# Patient Record
Sex: FEMALE | Race: BLACK OR AFRICAN AMERICAN | NOT HISPANIC OR LATINO | URBAN - METROPOLITAN AREA
[De-identification: names, ages, dates, MRNs, and addresses within clinical notes are randomized per-mention and may not be internally consistent; named-entity substitution may affect disease eponyms.]

---

## 2018-05-27 ENCOUNTER — EMERGENCY (EMERGENCY)
Facility: HOSPITAL | Age: 28
LOS: 0 days | Discharge: HOME | End: 2018-05-27
Attending: EMERGENCY MEDICINE
Payer: COMMERCIAL

## 2018-05-27 VITALS
TEMPERATURE: 98 F | DIASTOLIC BLOOD PRESSURE: 69 MMHG | WEIGHT: 154.98 LBS | RESPIRATION RATE: 19 BRPM | HEIGHT: 68 IN | SYSTOLIC BLOOD PRESSURE: 98 MMHG | OXYGEN SATURATION: 96 % | HEART RATE: 75 BPM

## 2018-05-27 VITALS
HEART RATE: 81 BPM | DIASTOLIC BLOOD PRESSURE: 69 MMHG | TEMPERATURE: 99 F | SYSTOLIC BLOOD PRESSURE: 111 MMHG | OXYGEN SATURATION: 97 % | RESPIRATION RATE: 20 BRPM

## 2018-05-27 DIAGNOSIS — R07.9 CHEST PAIN, UNSPECIFIED: ICD-10-CM

## 2018-05-27 DIAGNOSIS — J30.1 ALLERGIC RHINITIS DUE TO POLLEN: ICD-10-CM

## 2018-05-27 DIAGNOSIS — Z88.0 ALLERGY STATUS TO PENICILLIN: ICD-10-CM

## 2018-05-27 DIAGNOSIS — R60.9 EDEMA, UNSPECIFIED: ICD-10-CM

## 2018-05-27 LAB
ALBUMIN SERPL ELPH-MCNC: 3.7 G/DL — SIGNIFICANT CHANGE UP (ref 3.3–5)
ALP SERPL-CCNC: 64 U/L — SIGNIFICANT CHANGE UP (ref 40–120)
ALT FLD-CCNC: 13 U/L — SIGNIFICANT CHANGE UP (ref 12–78)
ANION GAP SERPL CALC-SCNC: 8 MMOL/L — SIGNIFICANT CHANGE UP (ref 5–17)
APTT BLD: 30.2 SEC — SIGNIFICANT CHANGE UP (ref 27.5–37.4)
AST SERPL-CCNC: 11 U/L — LOW (ref 15–37)
BASOPHILS # BLD AUTO: 0.06 K/UL — SIGNIFICANT CHANGE UP (ref 0–0.2)
BASOPHILS NFR BLD AUTO: 1.2 % — SIGNIFICANT CHANGE UP (ref 0–2)
BILIRUB SERPL-MCNC: 1.4 MG/DL — HIGH (ref 0.2–1.2)
BUN SERPL-MCNC: 8 MG/DL — SIGNIFICANT CHANGE UP (ref 7–23)
CALCIUM SERPL-MCNC: 8.2 MG/DL — LOW (ref 8.5–10.1)
CHLORIDE SERPL-SCNC: 107 MMOL/L — SIGNIFICANT CHANGE UP (ref 96–108)
CO2 SERPL-SCNC: 26 MMOL/L — SIGNIFICANT CHANGE UP (ref 22–31)
CREAT SERPL-MCNC: 0.81 MG/DL — SIGNIFICANT CHANGE UP (ref 0.5–1.3)
D DIMER BLD IA.RAPID-MCNC: <150 NG/ML DDU — SIGNIFICANT CHANGE UP
EOSINOPHIL # BLD AUTO: 0.17 K/UL — SIGNIFICANT CHANGE UP (ref 0–0.5)
EOSINOPHIL NFR BLD AUTO: 3.3 % — SIGNIFICANT CHANGE UP (ref 0–6)
GLUCOSE SERPL-MCNC: 85 MG/DL — SIGNIFICANT CHANGE UP (ref 70–99)
HCG SERPL-ACNC: <1 MIU/ML — SIGNIFICANT CHANGE UP
HCT VFR BLD CALC: 36.7 % — SIGNIFICANT CHANGE UP (ref 34.5–45)
HGB BLD-MCNC: 11.9 G/DL — SIGNIFICANT CHANGE UP (ref 11.5–15.5)
IMM GRANULOCYTES NFR BLD AUTO: 0.2 % — SIGNIFICANT CHANGE UP (ref 0–1.5)
INR BLD: 1.22 RATIO — HIGH (ref 0.88–1.16)
LYMPHOCYTES # BLD AUTO: 1.88 K/UL — SIGNIFICANT CHANGE UP (ref 1–3.3)
LYMPHOCYTES # BLD AUTO: 36.2 % — SIGNIFICANT CHANGE UP (ref 13–44)
MCHC RBC-ENTMCNC: 29 PG — SIGNIFICANT CHANGE UP (ref 27–34)
MCHC RBC-ENTMCNC: 32.4 GM/DL — SIGNIFICANT CHANGE UP (ref 32–36)
MCV RBC AUTO: 89.3 FL — SIGNIFICANT CHANGE UP (ref 80–100)
MONOCYTES # BLD AUTO: 0.49 K/UL — SIGNIFICANT CHANGE UP (ref 0–0.9)
MONOCYTES NFR BLD AUTO: 9.4 % — SIGNIFICANT CHANGE UP (ref 2–14)
NEUTROPHILS # BLD AUTO: 2.58 K/UL — SIGNIFICANT CHANGE UP (ref 1.8–7.4)
NEUTROPHILS NFR BLD AUTO: 49.7 % — SIGNIFICANT CHANGE UP (ref 43–77)
NRBC # BLD: 0 /100 WBCS — SIGNIFICANT CHANGE UP (ref 0–0)
PLATELET # BLD AUTO: 321 K/UL — SIGNIFICANT CHANGE UP (ref 150–400)
POTASSIUM SERPL-MCNC: 3.7 MMOL/L — SIGNIFICANT CHANGE UP (ref 3.5–5.3)
POTASSIUM SERPL-SCNC: 3.7 MMOL/L — SIGNIFICANT CHANGE UP (ref 3.5–5.3)
PROT SERPL-MCNC: 7.3 GM/DL — SIGNIFICANT CHANGE UP (ref 6–8.3)
PROTHROM AB SERPL-ACNC: 13.4 SEC — HIGH (ref 9.8–12.7)
RBC # BLD: 4.11 M/UL — SIGNIFICANT CHANGE UP (ref 3.8–5.2)
RBC # FLD: 11.5 % — SIGNIFICANT CHANGE UP (ref 10.3–14.5)
SODIUM SERPL-SCNC: 141 MMOL/L — SIGNIFICANT CHANGE UP (ref 135–145)
WBC # BLD: 5.19 K/UL — SIGNIFICANT CHANGE UP (ref 3.8–10.5)
WBC # FLD AUTO: 5.19 K/UL — SIGNIFICANT CHANGE UP (ref 3.8–10.5)

## 2018-05-27 PROCEDURE — 93970 EXTREMITY STUDY: CPT | Mod: 26

## 2018-05-27 PROCEDURE — 71045 X-RAY EXAM CHEST 1 VIEW: CPT | Mod: 26

## 2018-05-27 PROCEDURE — 93010 ELECTROCARDIOGRAM REPORT: CPT

## 2018-05-27 PROCEDURE — 99284 EMERGENCY DEPT VISIT MOD MDM: CPT

## 2018-05-27 RX ORDER — FAMOTIDINE 10 MG/ML
20 INJECTION INTRAVENOUS ONCE
Qty: 0 | Refills: 0 | Status: COMPLETED | OUTPATIENT
Start: 2018-05-27 | End: 2018-05-27

## 2018-05-27 RX ADMIN — Medication 30 MILLILITER(S): at 15:12

## 2018-05-27 RX ADMIN — FAMOTIDINE 20 MILLIGRAM(S): 10 INJECTION INTRAVENOUS at 15:12

## 2018-05-27 NOTE — ED PROVIDER NOTE - OBJECTIVE STATEMENT
28 year old female without significant PMH presenting to ED due to chest pressure x 1 day, no fever/chills bilateral leg swelling and no SOB. Denies chest trauma. 28 year old female without significant PMH presenting to ED due to chest pressure x 1 day, no fever/chills bilateral leg swelling and no SOB. Denies chest trauma. States in past leg swelling has been there too and wanted to make sure no clots were there, no recent travel.

## 2018-05-27 NOTE — ED PROVIDER NOTE - MUSCULOSKELETAL, MLM
Spine appears normal, range of motion is not limited, no muscle or joint tenderness nonpitting edema of bilateral feet, does not extend beyond ankle.

## 2018-05-27 NOTE — ED ADULT NURSE NOTE - OBJECTIVE STATEMENT
Patient alert with mother at bedside stating that she has had swelling in her left lower foot for 2 years and is concerned that yesterday it was really swollen. Elevated her foot and the swelling went away. No complaints of pain, no prior medical history, no prior surgical history.

## 2018-05-27 NOTE — ED ADULT TRIAGE NOTE - CHIEF COMPLAINT QUOTE
patient c/o of L foot swelling started 2 days ago , patient denied pain , patient stated she has this swelling on her foot on and off during the summer time for 2 years ,  patient c/o of chest pressure on and off since last night with difficulty breathing , denied N/V , patient c./o of upper back pain also , denied N/V , denied headache

## 2018-06-30 ENCOUNTER — EMERGENCY (EMERGENCY)
Facility: HOSPITAL | Age: 28
LOS: 1 days | Discharge: HOME | End: 2018-06-30
Admitting: EMERGENCY MEDICINE
Payer: COMMERCIAL

## 2018-06-30 VITALS
OXYGEN SATURATION: 98 % | HEART RATE: 80 BPM | TEMPERATURE: 98 F | DIASTOLIC BLOOD PRESSURE: 64 MMHG | SYSTOLIC BLOOD PRESSURE: 111 MMHG | RESPIRATION RATE: 18 BRPM
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
## 2018-06-30 DIAGNOSIS — F10.120 ALCOHOL ABUSE WITH INTOXICATION, UNCOMPLICATED: ICD-10-CM
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
## 2018-06-30 DIAGNOSIS — R41.82 ALTERED MENTAL STATUS, UNSPECIFIED: ICD-10-CM

## 2018-06-30 DIAGNOSIS — M25.472 EFFUSION, LEFT ANKLE: ICD-10-CM
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
## 2018-06-30 PROCEDURE — 73610 X-RAY EXAM OF ANKLE: CPT | Mod: 26,LT

## 2018-06-30 PROCEDURE — 99284 EMERGENCY DEPT VISIT MOD MDM: CPT | Mod: 25

## 2018-06-30 PROCEDURE — 72125 CT NECK SPINE W/O DYE: CPT | Mod: 26

## 2018-06-30 PROCEDURE — 70450 CT HEAD/BRAIN W/O DYE: CPT | Mod: 26

## 2018-06-30 NOTE — ED PROVIDER NOTE - PHYSICAL EXAMINATION
General: lethargic, arousable to touch, smells of alcohol  Head: NCAT, no lacerations  Eyes: PERRL  Heart: RRR  Lungs: CTAB  Abd: soft, NTND  Neuro: moves all 4 extremities equally  MSK: left ankle: swelling to lateral aspect, pulses intact, sensation intact  Skin: no e/o lacerations, abrasions, or ecchymoses
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
## 2018-06-30 NOTE — ED ADULT NURSE REASSESSMENT NOTE - NS ED NURSE REASSESS COMMENT FT1
Pt sleeping comfortably in stretcher at this time with no s.s of acute distress at this time. Pt stable, safety maintained. Will continue to monitor.
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
## 2022-06-28 ENCOUNTER — EMERGENCY (EMERGENCY)
Facility: HOSPITAL | Age: 32
LOS: 1 days | Discharge: HOME | End: 2022-06-28
Admitting: EMERGENCY MEDICINE

## 2022-06-28 VITALS
HEIGHT: 68 IN | HEART RATE: 81 BPM | SYSTOLIC BLOOD PRESSURE: 127 MMHG | RESPIRATION RATE: 16 BRPM | TEMPERATURE: 98 F | OXYGEN SATURATION: 100 % | DIASTOLIC BLOOD PRESSURE: 77 MMHG

## 2022-06-28 PROCEDURE — 99284 EMERGENCY DEPT VISIT MOD MDM: CPT
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
## 2022-06-29 VITALS
SYSTOLIC BLOOD PRESSURE: 108 MMHG | HEART RATE: 71 BPM | RESPIRATION RATE: 17 BRPM | OXYGEN SATURATION: 98 % | TEMPERATURE: 99 F | DIASTOLIC BLOOD PRESSURE: 74 MMHG

## 2022-06-29 PROCEDURE — 73502 X-RAY EXAM HIP UNI 2-3 VIEWS: CPT | Mod: 26,LT

## 2022-06-29 RX ORDER — IBUPROFEN 200 MG
600 TABLET ORAL ONCE
Refills: 0 | Status: COMPLETED | OUTPATIENT
Start: 2022-06-29 | End: 2022-06-29

## 2022-06-29 RX ADMIN — Medication 600 MILLIGRAM(S): at 01:34

## 2022-06-29 NOTE — ED PROVIDER NOTE - PATIENT PORTAL LINK FT
You can access the FollowMyHealth Patient Portal offered by [facility] by registering at the following website: [email or web address]. By joining [email or web address]’s FollowMyHealth portal, you will also be able to view your health information using other applications (apps) compatible with our system.
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
## 2022-07-07 ENCOUNTER — EMERGENCY (EMERGENCY)
Facility: HOSPITAL | Age: 32
LOS: 1 days | Discharge: HOME | End: 2022-07-07
Attending: EMERGENCY MEDICINE
Payer: COMMERCIAL

## 2022-07-07 VITALS
WEIGHT: 160.06 LBS | RESPIRATION RATE: 18 BRPM | OXYGEN SATURATION: 96 % | TEMPERATURE: 99 F | HEART RATE: 58 BPM | HEIGHT: 68 IN | DIASTOLIC BLOOD PRESSURE: 65 MMHG | SYSTOLIC BLOOD PRESSURE: 107 MMHG

## 2022-07-07 VITALS
DIASTOLIC BLOOD PRESSURE: 72 MMHG | SYSTOLIC BLOOD PRESSURE: 109 MMHG | OXYGEN SATURATION: 100 % | RESPIRATION RATE: 18 BRPM | TEMPERATURE: 98 F | HEART RATE: 66 BPM

## 2022-07-07 LAB — HCG UR QL: NEGATIVE — SIGNIFICANT CHANGE UP

## 2022-07-07 PROCEDURE — 99284 EMERGENCY DEPT VISIT MOD MDM: CPT | Mod: 25

## 2022-07-07 PROCEDURE — 72125 CT NECK SPINE W/O DYE: CPT | Mod: 26,MA

## 2022-07-07 PROCEDURE — 72128 CT CHEST SPINE W/O DYE: CPT | Mod: MA

## 2022-07-07 PROCEDURE — 99285 EMERGENCY DEPT VISIT HI MDM: CPT

## 2022-07-07 PROCEDURE — 72131 CT LUMBAR SPINE W/O DYE: CPT | Mod: MA

## 2022-07-07 PROCEDURE — 72128 CT CHEST SPINE W/O DYE: CPT | Mod: 26,MA

## 2022-07-07 PROCEDURE — 72131 CT LUMBAR SPINE W/O DYE: CPT | Mod: 26,MA

## 2022-07-07 PROCEDURE — 81025 URINE PREGNANCY TEST: CPT

## 2022-07-07 PROCEDURE — 72125 CT NECK SPINE W/O DYE: CPT | Mod: MA

## 2022-07-07 RX ORDER — IBUPROFEN 200 MG
1 TABLET ORAL
Qty: 27 | Refills: 0
Start: 2022-07-07 | End: 2022-07-15

## 2022-07-07 RX ORDER — IBUPROFEN 200 MG
400 TABLET ORAL ONCE
Refills: 0 | Status: COMPLETED | OUTPATIENT
Start: 2022-07-07 | End: 2022-07-07

## 2022-07-07 RX ORDER — LIDOCAINE 4 G/100G
1 CREAM TOPICAL
Qty: 10 | Refills: 0
Start: 2022-07-07 | End: 2022-07-11

## 2022-07-07 RX ORDER — LIDOCAINE 4 G/100G
1 CREAM TOPICAL ONCE
Refills: 0 | Status: COMPLETED | OUTPATIENT
Start: 2022-07-07 | End: 2022-07-07

## 2022-07-07 RX ADMIN — Medication 400 MILLIGRAM(S): at 14:55

## 2022-07-07 RX ADMIN — LIDOCAINE 1 PATCH: 4 CREAM TOPICAL at 14:56

## 2022-07-07 NOTE — ED PROVIDER NOTE - PATIENT PORTAL LINK FT
You can access the FollowMyHealth Patient Portal offered by [facility] by registering at the following website: [email or web address]. By joining [employer]’s FollowMyHealth portal, you will also be able to view your health information using other applications (apps) compatible with our system.
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
## 2022-07-07 NOTE — ED ADULT NURSE REASSESSMENT NOTE - NS ED NURSE REASSESS COMMENT FT1
Report received from TOR Castaneda. Pt A/O x4. Well appearing. Pt to be given Motrin and lidocaine patch. Safety and comfort maintained.

## 2022-07-07 NOTE — ED PROVIDER NOTE - PROGRESS NOTE DETAILS
pt reassessed - reports pain is improved. ambulatory in ED without difficulty. CTs negative for acute pathology. no indication for emergent MRI as ED team has low clinical suspicion for acute cord compression/cauda offered urgent followup with spine center. Will dc with follow up. Discussed plan and return precautions with patient who understands and agrees. All questions answered. - Marcel Napier PA-C

## 2022-07-07 NOTE — ED PROVIDER NOTE - NS ED ATTENDING STATEMENT MOD
This was a shared visit with the GAB. I reviewed and verified the documentation and independently performed the documented:
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
## 2022-07-11 PROBLEM — Z00.00 ENCOUNTER FOR PREVENTIVE HEALTH EXAMINATION: Status: ACTIVE | Noted: 2022-07-11

## 2022-07-21 ENCOUNTER — APPOINTMENT (OUTPATIENT)
Dept: SPINE | Facility: CLINIC | Age: 32
End: 2022-07-21

## 2022-07-21 VITALS
WEIGHT: 168 LBS | BODY MASS INDEX: 25.46 KG/M2 | HEIGHT: 68 IN | DIASTOLIC BLOOD PRESSURE: 75 MMHG | SYSTOLIC BLOOD PRESSURE: 111 MMHG | OXYGEN SATURATION: 94 % | HEART RATE: 81 BPM

## 2022-07-21 DIAGNOSIS — M54.50 LOW BACK PAIN, UNSPECIFIED: ICD-10-CM

## 2022-07-21 PROCEDURE — 99203 OFFICE O/P NEW LOW 30 MIN: CPT

## 2022-07-21 PROCEDURE — 99072 ADDL SUPL MATRL&STAF TM PHE: CPT

## 2022-07-22 RX ORDER — IBUPROFEN 200 MG/1
200 TABLET ORAL
Refills: 0 | Status: ACTIVE | COMMUNITY

## 2022-07-22 NOTE — CONSULT LETTER
[Dear  ___] : Dear  [unfilled], [Courtesy Letter:] : I had the pleasure of seeing your patient, [unfilled], in my office today. [Please see my note below.] : Please see my note below. [Consult Closing:] : Thank you very much for allowing me to participate in the care of this patient.  If you have any questions, please do not hesitate to contact me. [Sincerely,] : Sincerely, [FreeTextEntry3] : Trent Grande MD\par Chief of Neurosurgery [facility]\par [facility]\par
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
## 2022-07-22 NOTE — PHYSICAL EXAM
[Person] : oriented to person [Place] : oriented to place [Time] : oriented to time [Short Term Intact] : short term memory intact [Remote Intact] : remote memory intact [Span Intact] : the attention span was normal [Concentration Intact] : normal concentrating ability [Fluency] : fluency intact [Comprehension] : comprehension intact [Current Events] : adequate knowledge of current events [Past History] : adequate knowledge of personal past history [Vocabulary] : adequate range of vocabulary [Cranial Nerves Optic (II)] : visual acuity intact bilaterally,  pupils equal round and reactive to light [Cranial Nerves Oculomotor (III)] : extraocular motion intact [Cranial Nerves Trigeminal (V)] : facial sensation intact symmetrically [Cranial Nerves Facial (VII)] : face symmetrical [Cranial Nerves Vestibulocochlear (VIII)] : hearing was intact bilaterally [Cranial Nerves Glossopharyngeal (IX)] : tongue and palate midline [Cranial Nerves Accessory (XI - Cranial And Spinal)] : head turning and shoulder shrug symmetric [Cranial Nerves Hypoglossal (XII)] : there was no tongue deviation with protrusion [Motor Tone] : muscle tone was normal in all four extremities [Motor Strength] : muscle strength was normal in all four extremities [No Muscle Atrophy] : normal bulk in all four extremities [Sensation Tactile Decrease] : light touch was intact [Abnormal Walk] : normal gait [Balance] : balance was intact [2+] : Ankle jerk left 2+ [No Tenderness to Palpation] : no spine tenderness on palpation [Able to toe walk] : the patient was able to toe walk [Able to heel walk] : the patient was able to heel walk [Past-pointing] : there was no past-pointing [Tremor] : no tremor present [Plantar Reflex Right Only] : normal on the right [Plantar Reflex Left Only] : normal on the left [Izaguirre] : Izaguirre's sign was not demonstrated [L'Hermitte's] : neck flexion did not produce tingling down the spine/arms [Spurling's - Opposite Side] : Negative Spurling's on opposite side [Spurling's Same Side] : Negative Spurling's on same side [Straight-Leg Raise Test - Left] : straight leg raise of the left leg was negative [Straight-Leg Raise Test - Right] : straight leg raise  of the right leg was negative

## 2022-07-25 ENCOUNTER — TRANSCRIPTION ENCOUNTER (OUTPATIENT)
Age: 32
End: 2022-07-25

## 2022-11-02 ENCOUNTER — APPOINTMENT (OUTPATIENT)
Dept: NEUROLOGY | Facility: CLINIC | Age: 32
End: 2022-11-02

## 2023-03-14 NOTE — ED PROVIDER NOTE - DISPOSITION TYPE
[facility]  Inpatient Services  Wound and Skin Care Brief Evaluation    Admission Date: 2/25/2023     Last order of IP CONSULT TO WOUND CARE was found on 3/6/2023 from Hospital Encounter on 2/25/2023     HPI, PMH, SH: Reviewed    No chief complaint on file.    Diagnosis: Acute respiratory failure with hypoxia (HCC) [J96.01]    Unit where seen by Wound Team: S407/00     Attending MD requested wound team return regarding right arm/axilla. Chart and images reviewed. This discussed with bedside RN. This RN in to assess patient. Pt mom at bedside but sleeping.  Patient right arm/axilla with partial thickness skin tear present, mepitel one in place to protect area, intact and can remain in place until area is completely healed. Area is improving as expected.  No pressure injuries or advanced wound care needs identified. Wound consult completed. No further follow up unless indicated and consulted.   Wound team is signing off.     RSKIN:   CURRENTLY IN PLACE (X), APPLIED THIS VISIT (A), ORDERED (O):   Q shift Prateek:  X  Q shift pressure point assessments:  X    Surface/Positioning n/a crib  Pressure redistribution mattress            Low Airloss          Bariatric foam      Bariatric AYUSH     ICU AYUSH                              Waffle cushion        Waffle Overlay          Reposition q 2 hours      TAPs Turning system     Z Abdelrahman Pillow      Respiratory n/a  Silicone O2 tubing         Gray Foam Ear protectors     Cannula fixation Device (Tender )          High flow offloading Clip    Elastic head band offloading device      Anchorfast                                                         Trach with Optifoam split foam             Containment/Moisture Prevention n/a    Rectal tube or BMS    Purwick/Condom Cath        Dupree Catheter    Barrier wipes           Barrier paste       Antifungal tx      Interdry        Mobilization RENA      Up to chair        Ambulate      PT/OT      Nutrition       Dietician         Diabetes Education      PO  breast milk   TF    TPN     NPO   # days     Other          DISCHARGE

## 2025-05-30 ENCOUNTER — NON-APPOINTMENT (OUTPATIENT)
Age: 35
End: 2025-05-30

## 2025-05-30 ENCOUNTER — EMERGENCY (EMERGENCY)
Facility: HOSPITAL | Age: 35
LOS: 1 days | End: 2025-05-30
Attending: EMERGENCY MEDICINE
Payer: COMMERCIAL

## 2025-05-30 VITALS
HEART RATE: 68 BPM | SYSTOLIC BLOOD PRESSURE: 107 MMHG | TEMPERATURE: 99 F | DIASTOLIC BLOOD PRESSURE: 70 MMHG | RESPIRATION RATE: 18 BRPM | OXYGEN SATURATION: 99 %

## 2025-05-30 VITALS
TEMPERATURE: 98 F | HEART RATE: 79 BPM | HEIGHT: 68 IN | SYSTOLIC BLOOD PRESSURE: 107 MMHG | WEIGHT: 164.91 LBS | RESPIRATION RATE: 16 BRPM | OXYGEN SATURATION: 98 % | DIASTOLIC BLOOD PRESSURE: 70 MMHG

## 2025-05-30 DIAGNOSIS — R09.A2 FOREIGN BODY SENSATION, THROAT: ICD-10-CM

## 2025-05-30 PROBLEM — Z78.9 OTHER SPECIFIED HEALTH STATUS: Chronic | Status: ACTIVE | Noted: 2022-07-07

## 2025-05-30 PROCEDURE — 81025 URINE PREGNANCY TEST: CPT

## 2025-05-30 PROCEDURE — 99285 EMERGENCY DEPT VISIT HI MDM: CPT

## 2025-05-30 PROCEDURE — 70490 CT SOFT TISSUE NECK W/O DYE: CPT | Mod: 26

## 2025-05-30 PROCEDURE — 31575 DIAGNOSTIC LARYNGOSCOPY: CPT

## 2025-05-30 PROCEDURE — 70490 CT SOFT TISSUE NECK W/O DYE: CPT

## 2025-05-30 PROCEDURE — 99283 EMERGENCY DEPT VISIT LOW MDM: CPT | Mod: 25

## 2025-05-30 PROCEDURE — 99284 EMERGENCY DEPT VISIT MOD MDM: CPT | Mod: 25

## 2025-05-30 NOTE — CONSULT NOTE ADULT - NS ATTEND AMEND GEN_ALL_CORE FT
ENT consulted for globus sensation after eating fish    36 y/o Female, no PMHx presents to ED c/o globus sensation x2 days. Pt states she ate fish (snapper) 2 days ago and felt something sharp get stuck in her throat. Pt then tried to eat/drink other foods in an attempt to push it down, however, no symptomatic improvement. Pt states she has been able to eat and drink but with some pain in her throat.     Flexible laryngoscopy shows no foreign body visualized. Vocal fold mobile and symmetrical. Airway patent.    Recommend:  Globus Sensation  - Recommend CT Neck   - No acute surgical ENT intervention required at this time, please call back if needed. ENT consulted for globus sensation after eating fish    34 y/o Female, no PMHx presents to ED c/o globus sensation x2 days. Pt states she ate fish (snapper) 2 days ago and felt something sharp get stuck in her throat. Pt then tried to eat/drink other foods in an attempt to push it down, however, no symptomatic improvement. Pt states she has been able to eat and drink but with some pain in her throat.     Flexible laryngoscopy shows no gross foreign body visualized. Vocal fold mobile and symmetrical. Airway patent.    Recommend:  Globus Sensation  - Recommend CT Neck to evaluate further any foreign body in airway  - Call ENT if any findings

## 2025-05-30 NOTE — ED PROVIDER NOTE - CARE PROVIDER_API CALL
Leona Painter  Otolaryngology  [address], [address]  [place] [address]  Phone: [phone]  Fax: [phone]  Follow Up Time: 4-6 Days    Regan Garcia  Otolaryngology  [address], [address]  [place] [address]  Phone: [phone]  Fax: [phone]  Follow Up Time: 4-6 Days    Radha Henderson  Otolaryngology  [address], [address]  [place] [address]  Phone: [phone]  Fax: [phone]  Follow Up Time: 4-6 Days    Ollie Crow  Otolaryngology  [address] [place] [address]  Phone: [phone]  Fax: [phone]  Follow Up Time: 4-6 Days

## 2025-05-30 NOTE — ED ADULT NURSE NOTE - CCCP TRG CHIEF CMPLNT
67M with seizures on keppra, BPH, hypothyroidism, sarcoidosis GERD, COPD, LAMBERT, recent admission to [facility] from 5/3-5/10/2021 for right subarachnoid hemorrhage s/p fall who presents from [facility] for hypoxia.  Found to have acute hypoxic and hypercapnic respiratory failure 2/2 sepsis 2/2 aspiration PNA.  Likely aspiration PNA since patient has been recommended dysphagia I diet from [facility] but reports state that he has been non-compliant with recommendation.  Also location of RLL and RUL also fits with aspiration.      Problems  Acute hypoxic and hypercapnic respiratory failure  Sepsis 2/2 aspiration PNA  Sarcoidosis  Seizures  COPD    Acute hypoxic and hypercapnic respiratory failure secondary to aspiration PNA and underlying interstitial lung disease  - admitted to [facility]  -off high flow:: now on nasal cannula   -CXR followup 6/25:: severe interestial disease +fibrosis    Sepsis from aspiration PNA with metabolic encephalopathy - sepsis resolving  - meropenem total 10 days as per ID, abx course now complete  - f/u blood cultures - NGTD  - speech and swallow eval -failed 6/29.  NGT placed for feeding and medication.  have discussed possibility of PEG with patient and sister; as per d/w speech therapist, pt still not in position to start po diet, will reattempt swallowing study as pt becomes more and more alert - for now recommend oral care and swallowing exercises  - OMFS eval appreciated - no signs of oral infection    Troponin Elevation  - likely demand ischemia, no need to trend further  - cardiology f/u appreciated  -  TTE noted - elevated RV pressures likely due to pna    Sarcoidosis/COPD  - started on steroids for history of bronchiectasis as well - being tapered by pulm  - symbicort - patient not able to use properly - can dc.     Seizure history  - cont with depakote    Hypothyroidism  - cont with synthroid - switch to NGT    General anxiety disorder  - restart Effexor    BPH  - Herrera dc'ed 6/30,  flomax cannot be given via NGT.  Proscar started 6/29    Preventive measures  - can start with lovenox 40mg subq for DVT ppx    Patient critically ill, high risk for deterioration.     foreign body throat

## 2025-05-30 NOTE — ED PROVIDER NOTE - PROGRESS NOTE DETAILS
Dr. Vanessa: D/w ENT attending Dr. Crow as well ENT at bedside. As discuss with ENT no visualized FB via laryngoscope and recommended neck CT. Pt came back from CT. NAD. Pending result now. Attending MD Bradley: Spoke with Dr. Randle of radiology, + foreign body noted on CT, ENT called and made aware Attending MD Bradley: ENT MARIA DEL CARMEN Griffith re-evaluated patient, area in question palpated and re-visualized with nothing.  Patient reports resolution of discomfort.  Patient to follow up with outpatient ENT in a week.  Stable for discharge. Follow up instructions given, importance of follow up emphasized, return to ED parameters reviewed and patient verbalized understanding.  All questions answered, all concerns addressed. Print out of available imaging will be given to patient as part of their discharge paperwork.

## 2025-05-30 NOTE — ED PROVIDER NOTE - SHIFT CHANGE DETAILS
f/u ENT, xray, reassess f/u ENT, xray, reassess    16:12 Mirna from [facility]  Attending MD Bradley: 35F w sensation of having fishbone stuck in throat, tolerating po, seen by ENT and no foreign body visualized, pending CT neck

## 2025-05-30 NOTE — ED PROVIDER NOTE - NSFOLLOWUPINSTRUCTIONS_ED_ALL_ED_FT
YOU WERE SEEN IN THE ED FOR: foreign body of throat     WHILE YOU WERE HERE, YOU HAD: CT of your neck.  It showed: tiny 5mm radiodense foreign body consistent with a fish in the left oropharyngeal mucosal space medial and inferior to the palatine tonsil. You were evaluated by ENT but nothing was removed.  You reported resolution of your symptoms.    FOR PAIN, YOU MAY TAKE TYLENOL (ACETAMINOPHEN) AND/OR IBUPROFEN (Advil or Motrin). FOLLOW THE INSTRUCTIONS ON THE LABEL/CONTAINER.  DO NOT EXCEED 4000MG OF TYLENOL (ACETAMINOPHEN) IN A 24 HOUR PERIOD. TAKE IBUPROFEN WITH FOOD.      PLEASE FOLLOW UP WITH ENT IN A WEEK. BRING COPIES OF YOUR RESULTS/DISCHARGE PAPERS.    RETURN TO THE EMERGENCY DEPARTMENT IF YOU EXPERIENCE ANY NEW/CONCERNING/WORSENING SYMPTOMS SUCH AS BUT NOT LIMITED TO: RETURN OF SYMPTOMS, DIFFICULTY SWALLOWING, DIFFICULTY BREATHING, CHEST PAIN, SEVERE ABDOMINAL PAIN OR ANY OTHER CONCERNS.

## 2025-05-30 NOTE — ED PROVIDER NOTE - PATIENT PORTAL LINK FT
You can access the FollowMyHealth Patient Portal offered by [facility] by registering at the following website: [email or web address]. By joining [employer]’s FollowMyHealth portal, you will also be able to view your health information using other applications (apps) compatible with our system.

## 2025-05-30 NOTE — ED PROVIDER NOTE - PROVIDER TOKENS
PROVIDER:[TOKEN:[[id number]],FOLLOWUP:[4-6 Days]],PROVIDER:[TOKEN:[[id number]],FOLLOWUP:[4-6 Days]],PROVIDER:[TOKEN:[[id number]],FOLLOWUP:[4-6 Days]],PROVIDER:[TOKEN:[[id number]],FOLLOWUP:[4-6 Days]]

## 2025-05-30 NOTE — ED ADULT NURSE NOTE - OBJECTIVE STATEMENT
35y Female AOX4 presents to the ED c/o foreign body. Pt reports fishbone in throat about 2 days ago. Has been able to tolerate food and drink but experiencing increased pain in throat. No acute respiratory distress or drooling noted. Denies N/V, fever/chills, SOB, chest pain. Spontaneous/unlabored respirations, speaking in full sentences. Side rails up, bed in lowest position, safety maintained.

## 2025-05-30 NOTE — ED ADULT TRIAGE NOTE - CHIEF COMPLAINT QUOTE
Patient c/o possible fish bone stuck in her throat since last night. Pain with swallowing. No diff breathing.

## 2025-05-30 NOTE — ED PROVIDER NOTE - CARE PROVIDERS DIRECT ADDRESSES
[email or web address]

## 2025-05-30 NOTE — ED PROVIDER NOTE - CLINICAL SUMMARY MEDICAL DECISION MAKING FREE TEXT BOX
Dr. Vanessa: 35-year-old female no past medical history was eating fish 2 days ago and feels a fishbone stuck on the left side of her throat.  Able to tolerate secretions, and eat and drink.  No nausea vomiting abdominal pain.  No other complaints.    Gen: No acute distress  HEENT: Mucous membranes moist, pink conjunctivae, EOMI, no crepitus, no visualized foreign body  CV: RRR, no clubbing/cyanosis/edema  Resp: CTAB  GI: Abdomen soft, NT, ND. Normal BS. No rebound, no guarding  : No CVAT  Neuro: A&O x 3, moving all 4 extremities  MSK: No spine or joint tenderness to palpation  Skin: No rashes    Patient presenting with foreign body sensation after eating fish, feels a fishbone stuck in her throat.  Will get x-ray however fishbone is likely radiolucent.  Discussed with ENT PA who will scope patient.  ENT attending Dr. Crow was in the ED, discussed with him, he will follow-up patient's results as well.

## 2025-05-30 NOTE — ED PROVIDER NOTE - OBJECTIVE STATEMENT
36yo female, no PMHx presents to ED with Dineshe stuck in her throat 2days ago. Pt states she's been able to eat and drink but felt pain in her throat. Denies fever, chills, N/V, or abd pain.

## 2025-05-30 NOTE — ED PROVIDER NOTE - ATTENDING APP SHARED VISIT CONTRIBUTION OF CARE
Dr. Vanessa: I performed a face to face bedside interview with patient regarding history of present illness, review of symptoms and past medical history. I completed an independent physical exam.  I have discussed patient's plan of care with GAB.   I agree with note as stated above, having amended the EMR as needed to reflect my findings.   This includes HISTORY OF PRESENT ILLNESS, HIV, PAST MEDICAL/SURGICAL/FAMILY/SOCIAL HISTORY, ALLERGIES AND HOME MEDICATIONS, REVIEW OF SYSTEMS, PHYSICAL EXAM, and any PROGRESS NOTES during the time I functioned as the attending physician for this patient.    see mdm

## 2025-05-30 NOTE — CONSULT NOTE ADULT - PROBLEM SELECTOR RECOMMENDATION 9
- Recommend CT Neck   - No acute surgical ENT intervention required at this time, please call back if needed. - Recommend CT Neck to evaluate further any foreign body in airway  - Call ENT if any findings

## 2025-05-30 NOTE — ED PROVIDER NOTE - PHYSICAL EXAMINATION
NAD. VSS. Afebrile. Throat unremarkable without visualized FB, No neck swelling or lesions. Lungs clear. Neuro intact.

## 2025-05-30 NOTE — CHART NOTE - NSCHARTNOTEFT_GEN_A_CORE
PA called with CT results   < from: CT Neck Soft Tissue No Cont (05.30.25 @ 19:05) >    IMPRESSION:    Tiny 5mm radiodense foreign body consistent with a fish in the left   oropharyngeal mucosal space medial and inferior to the palatine tonsil.    These findings discussed with ED provider Dr. Raman Bradley with readback   confirmation by Dr. Awais Sheffield at 5/30/2025 8:15 PM.    < end of copied text >    Pt states she continues to feeling poking in the back of his throat   On exam multiple small areas of excoriation on left tonsil  area palpated w firm fish bone in tonsil, attempted to remove with alligator forceps   minimal bleeding noted, pt tolerated well.   Pt feeling well without any pain in throat  A/P   Pt discussed in detail with Dr. Martinez, plan to   Pt is to follow up at ENT in 2 weeks with Santigao Taylor Bruni, [place]. Call [phone] to make appointment   if pain reoccurs pt is to follow up sooner than 2 weeks   soft diet   tylenol/ibuprofen prn

## 2025-05-30 NOTE — CONSULT NOTE ADULT - ASSESSMENT
34 y/o Female, no PMHx presents to ED c/o globus sensation x2 days. Pt states she ate fish (snapper) 2 days ago and felt something sharp get stuck in her throat. Pt then tried to eat/drink other foods in an attempt to push it down, however, no symptomatic improvement. Pt states she has been able to eat and drink but with some pain in her throat. Flexible laryngoscopy was unremarkable, no foreign body visualized, airway patent.  ENT consulted for globus sensation after eating fish    34 y/o Female, no PMHx presents to ED c/o globus sensation x2 days. Pt states she ate fish (snapper) 2 days ago and felt something sharp get stuck in her throat. Pt then tried to eat/drink other foods in an attempt to push it down, however, no symptomatic improvement. Pt states she has been able to eat and drink but with some pain in her throat.     Flexible laryngoscopy shows no gross foreign body visualized. Vocal fold mobile and symmetrical. Airway patent.    Recommend:  Globus Sensation  - Recommend CT Neck to evaluate further any foreign body in airway  - Call ENT if any findings